# Patient Record
Sex: MALE | Race: OTHER | NOT HISPANIC OR LATINO | URBAN - METROPOLITAN AREA
[De-identification: names, ages, dates, MRNs, and addresses within clinical notes are randomized per-mention and may not be internally consistent; named-entity substitution may affect disease eponyms.]

---

## 2020-09-11 ENCOUNTER — EMERGENCY (EMERGENCY)
Facility: HOSPITAL | Age: 45
LOS: 1 days | Discharge: HOME | End: 2020-09-11
Attending: EMERGENCY MEDICINE
Payer: MEDICAID

## 2020-09-11 VITALS
DIASTOLIC BLOOD PRESSURE: 91 MMHG | RESPIRATION RATE: 18 BRPM | HEART RATE: 82 BPM | TEMPERATURE: 99 F | SYSTOLIC BLOOD PRESSURE: 153 MMHG | OXYGEN SATURATION: 96 %

## 2020-09-11 VITALS
SYSTOLIC BLOOD PRESSURE: 149 MMHG | RESPIRATION RATE: 18 BRPM | OXYGEN SATURATION: 97 % | HEART RATE: 90 BPM | DIASTOLIC BLOOD PRESSURE: 114 MMHG

## 2020-09-11 LAB
ALBUMIN SERPL ELPH-MCNC: 4.8 G/DL — SIGNIFICANT CHANGE UP (ref 3.3–5)
ALP SERPL-CCNC: 103 U/L — SIGNIFICANT CHANGE UP (ref 40–120)
ALT FLD-CCNC: 41 U/L — SIGNIFICANT CHANGE UP (ref 10–45)
ANION GAP SERPL CALC-SCNC: 14 MMOL/L — SIGNIFICANT CHANGE UP (ref 5–17)
AST SERPL-CCNC: 31 U/L — SIGNIFICANT CHANGE UP (ref 10–40)
BASOPHILS # BLD AUTO: 0.02 K/UL — SIGNIFICANT CHANGE UP (ref 0–0.2)
BASOPHILS NFR BLD AUTO: 0.2 % — SIGNIFICANT CHANGE UP (ref 0–2)
BILIRUB SERPL-MCNC: 0.3 MG/DL — SIGNIFICANT CHANGE UP (ref 0.2–1.2)
BUN SERPL-MCNC: 13 MG/DL — SIGNIFICANT CHANGE UP (ref 7–23)
CALCIUM SERPL-MCNC: 10.2 MG/DL — SIGNIFICANT CHANGE UP (ref 8.4–10.5)
CHLORIDE SERPL-SCNC: 100 MMOL/L — SIGNIFICANT CHANGE UP (ref 96–108)
CO2 SERPL-SCNC: 23 MMOL/L — SIGNIFICANT CHANGE UP (ref 22–31)
CREAT SERPL-MCNC: 1 MG/DL — SIGNIFICANT CHANGE UP (ref 0.5–1.3)
EOSINOPHIL # BLD AUTO: 0.05 K/UL — SIGNIFICANT CHANGE UP (ref 0–0.5)
EOSINOPHIL NFR BLD AUTO: 0.6 % — SIGNIFICANT CHANGE UP (ref 0–6)
GLUCOSE SERPL-MCNC: 157 MG/DL — HIGH (ref 70–99)
HCT VFR BLD CALC: 43.6 % — SIGNIFICANT CHANGE UP (ref 39–50)
HGB BLD-MCNC: 14.9 G/DL — SIGNIFICANT CHANGE UP (ref 13–17)
IMM GRANULOCYTES NFR BLD AUTO: 0.4 % — SIGNIFICANT CHANGE UP (ref 0–1.5)
LYMPHOCYTES # BLD AUTO: 2.55 K/UL — SIGNIFICANT CHANGE UP (ref 1–3.3)
LYMPHOCYTES # BLD AUTO: 29.8 % — SIGNIFICANT CHANGE UP (ref 13–44)
MCHC RBC-ENTMCNC: 32 PG — SIGNIFICANT CHANGE UP (ref 27–34)
MCHC RBC-ENTMCNC: 34.2 GM/DL — SIGNIFICANT CHANGE UP (ref 32–36)
MCV RBC AUTO: 93.8 FL — SIGNIFICANT CHANGE UP (ref 80–100)
MONOCYTES # BLD AUTO: 0.52 K/UL — SIGNIFICANT CHANGE UP (ref 0–0.9)
MONOCYTES NFR BLD AUTO: 6.1 % — SIGNIFICANT CHANGE UP (ref 2–14)
NEUTROPHILS # BLD AUTO: 5.38 K/UL — SIGNIFICANT CHANGE UP (ref 1.8–7.4)
NEUTROPHILS NFR BLD AUTO: 62.9 % — SIGNIFICANT CHANGE UP (ref 43–77)
NRBC # BLD: 0 /100 WBCS — SIGNIFICANT CHANGE UP (ref 0–0)
PLATELET # BLD AUTO: 215 K/UL — SIGNIFICANT CHANGE UP (ref 150–400)
POTASSIUM SERPL-MCNC: 4 MMOL/L — SIGNIFICANT CHANGE UP (ref 3.5–5.3)
POTASSIUM SERPL-SCNC: 4 MMOL/L — SIGNIFICANT CHANGE UP (ref 3.5–5.3)
PROT SERPL-MCNC: 7.4 G/DL — SIGNIFICANT CHANGE UP (ref 6–8.3)
RBC # BLD: 4.65 M/UL — SIGNIFICANT CHANGE UP (ref 4.2–5.8)
RBC # FLD: 12.8 % — SIGNIFICANT CHANGE UP (ref 10.3–14.5)
SODIUM SERPL-SCNC: 137 MMOL/L — SIGNIFICANT CHANGE UP (ref 135–145)
TROPONIN T, HIGH SENSITIVITY RESULT: <6 NG/L — SIGNIFICANT CHANGE UP (ref 0–51)
WBC # BLD: 8.55 K/UL — SIGNIFICANT CHANGE UP (ref 3.8–10.5)
WBC # FLD AUTO: 8.55 K/UL — SIGNIFICANT CHANGE UP (ref 3.8–10.5)

## 2020-09-11 PROCEDURE — 99284 EMERGENCY DEPT VISIT MOD MDM: CPT

## 2020-09-11 PROCEDURE — 85025 COMPLETE CBC W/AUTO DIFF WBC: CPT

## 2020-09-11 PROCEDURE — 71260 CT THORAX DX C+: CPT

## 2020-09-11 PROCEDURE — 74177 CT ABD & PELVIS W/CONTRAST: CPT

## 2020-09-11 PROCEDURE — 74177 CT ABD & PELVIS W/CONTRAST: CPT | Mod: 26

## 2020-09-11 PROCEDURE — 70450 CT HEAD/BRAIN W/O DYE: CPT | Mod: 26

## 2020-09-11 PROCEDURE — 84484 ASSAY OF TROPONIN QUANT: CPT

## 2020-09-11 PROCEDURE — 99285 EMERGENCY DEPT VISIT HI MDM: CPT

## 2020-09-11 PROCEDURE — 71260 CT THORAX DX C+: CPT | Mod: 26

## 2020-09-11 PROCEDURE — 70450 CT HEAD/BRAIN W/O DYE: CPT

## 2020-09-11 PROCEDURE — 72125 CT NECK SPINE W/O DYE: CPT | Mod: 26

## 2020-09-11 PROCEDURE — 72125 CT NECK SPINE W/O DYE: CPT

## 2020-09-11 PROCEDURE — 80053 COMPREHEN METABOLIC PANEL: CPT

## 2020-09-11 RX ORDER — ACETAMINOPHEN 500 MG
650 TABLET ORAL ONCE
Refills: 0 | Status: DISCONTINUED | OUTPATIENT
Start: 2020-09-11 | End: 2020-09-15

## 2020-09-11 NOTE — ED PROVIDER NOTE - NEUROLOGICAL, MLM
Alert and oriented, no focal deficits, no motor or sensory deficits. **ATTENDING ADDENDUM (Dr. Darius Epps): [place] coma score = 15 (eyes =4, verbal =5, motor =6). NO posturing. NO focal motor weakness. NO sensory changes. Awake, alert, coherent, interactive, and oriented to person, place, and time.

## 2020-09-11 NOTE — ED PROVIDER NOTE - NEURO NEGATIVE STATEMENT, MLM
no loss of consciousness, no gait abnormality, no headache, no sensory deficits, and no weakness. **ATTENDING ADDENDUM (Dr. Darius Epps): NO numbness, tingling, weakness, or paresthesias.

## 2020-09-11 NOTE — ED ADULT NURSE NOTE - OBJECTIVE STATEMENT
Patient is a 45 year old male complaining of chest, back, neck pain s/p trauma. Patient denies past medical history. Patient is A&O x 4. Pt reports working on a car and the car falling on his chest. pt denies LOC, denies numbness/ tingling. pt states he was ambulating after accident. pt able to move all extremities. pt placed in cervical collar. pt Denies complaints of sob, , n/v/d, headache. Abd is soft, non tender, non distended. Skin is warm and dry. Color is consistent with ethnicity. Safety and comfort maintained.  Will continue to monitor.

## 2020-09-11 NOTE — ED PROVIDER NOTE - CARE PLAN
Goal:	**ATTENDING ADDENDUM (Dr. Darius Epsp): Goals of care include resolution of emergent/urgent symptoms and concerns, and restoration to baseline level of homeostasis.   Principal Discharge DX:	Chest wall pain  Goal:	**ATTENDING ADDENDUM (Dr. Darius Epps): Goals of care include resolution of emergent/urgent symptoms and concerns, and restoration to baseline level of homeostasis.  Secondary Diagnosis:	Blunt chest trauma, initial encounter  Secondary Diagnosis:	Neck sprain, initial encounter  Secondary Diagnosis:	Shoulder strain, left, initial encounter

## 2020-09-11 NOTE — ED PROVIDER NOTE - OTHER FINDINGS
axis 25 NO ZAMZAM **ATTENDING ADDENDUM (Dr. Darius Epps): This ECG was viewed and interpreted contemporaneous with the time of actual patient care on 09/11/2020. This retrospective entry was made to the EMR on 09/13/2020 at 12:25.

## 2020-09-11 NOTE — ED PROVIDER NOTE - ENMT, MLM
Airway patent, Nasal mucosa clear. Mouth with normal mucosa. Throat has no vesicles, no oropharyngeal exudates and uvula is midline. **ATTENDING ADDENDUM (Dr. Darius Epps): AIRWAY CLEAR. NO pooling of secretions, POSITIVE gag reflex, NO debris, ABLE TO SELF-PROTECT AIRWAY. POSITIVE full range of motion of the mandible. NO temporomandibular joint tenderness with range of motion or palpation. NO dental trauma. NO malocclusion. NO facial or nasal deformity or bony tenderness. NO epistaxis or septal hematoma noted. NO jugular venous distension. NO plethora noted in the face/neck.

## 2020-09-11 NOTE — ED PROVIDER NOTE - AGGRAVATING FACTORS
**ATTENDING ADDENDUM (Dr. Darius Epps): shoulder and neck pain  worse with palpation and movement. Chest wall pain worse with movement, palpation, and breathing motion.

## 2020-09-11 NOTE — ED PROVIDER NOTE - UPPER EXTREMITY EXAM, LEFT
TENDERNESS/**ATTENDING ADDENDUM (Dr. Darius Epps): NO deformity. NO soft-tissue swelling. NO distal discoloration.

## 2020-09-11 NOTE — ED PROVIDER NOTE - PATIENT PORTAL LINK FT
You can access the FollowMyHealth Patient Portal offered by [facility] by registering at the following website: [email or web address]. By joining [employer]’s FollowMyHealth portal, you will also be able to view your health information using other applications (apps) compatible with our system.

## 2020-09-11 NOTE — ED PROVIDER NOTE - RESPIRATORY, MLM
Breath sounds clear and equal bilaterally. **ATTENDING ADDENDUM (Dr. Darius Epps): BREATHING CLEAR. POSITIVE BILATERAL BREATH SOUNDS auscultated. NO stridor, drooling, tripoding, or wheezing. POSITIVE bilateral chest wall expansion WITHOUT crepitus, tenderness, or deformity. POSITIVE midline trachea.
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
## 2020-09-11 NOTE — ED PROVIDER NOTE - SIGNIFICANT NEGATIVE FINDINGS
**ATTENDING ADDENDUM (Dr. Darius Epps): NO loss of consciousness. NO near-syncope. NO palpitations, abdominal pain, or severe back pain. NO focal or generalized weakness. NO numbness, tingling, weakness, or paresthesias.

## 2020-09-11 NOTE — ED PROVIDER NOTE - EYES, MLM
Clear bilaterally, pupils equal, round and reactive to light. **ATTENDING ADDENDUM (Dr. Darius Epps): Extraocular muscle movements intact. Clear corneas bilaterally, pupils equal and round. NO subconjunctival hemorrhage.
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
## 2020-09-11 NOTE — ED PROVIDER NOTE - EMS NOTE SUMMARY FREE TEXT FOR MDM OBTAINED AND REVIEWED OLD RECORDS QUESTION
**ATTENDING ADDENDUM (Dr. Darius Epps): EMS(s) present during patient's initial ED presentation; corroborated CC, HPI and review of systems as provided by patient.

## 2020-09-11 NOTE — ED PROVIDER NOTE - MUSCULOSKELETAL NECK EXAM
trachea midline/supple/**ATTENDING ADDENDUM (Dr. Darius Epps): POSITIVE left-sided paraspinal tenderness. NO cervical-thoracic-lumbar-sacral midline bony stepoff noted. POSITIVE mild midline cervical tenderness to palpation.

## 2020-09-11 NOTE — ED PROVIDER NOTE - SKIN, MLM
Skin normal color for race, warm, dry and intact. No evidence of rash. **ATTENDING ADDENDUM (Dr. Darius Epps): NO rashes, lesions, lacerations, abrasions, ulcers, vesicles, erythema, streaking, lymphangitic spread, crepitus, cellulitis, petechiae, purpurae, track marks or ecchymoses.

## 2020-09-11 NOTE — ED PROVIDER NOTE - PHYSICAL EXAMINATION
GENERAL: non-toxic appearing, in NAD  HEAD: atraumatic, normocephalic  EYES: vision grossly intact, no conjunctivitis or discharge  EARS: hearing grossly intact  NOSE: no nasal discharge, epistaxis   CARDIAC: ttp over anterior chest wall, RRR, normal S1S2,  no appreciable murmurs, no cyanosis, cap refill < 2 seconds  PULM: no respiratory distress, oxygen saturation on RA wnl, CTAB, no crackles, rales, rhonchi, or wheezing  GI: abdomen nondistended, soft, nontender, no guarding or rebound tenderness, no palpable masses  NEURO: awake and alert, follows commands, normal speech, PERRLA, EOMI, no focal motor or sensory deficits, normal gait  MSK: spine appears normal, no joint swelling or erythema, no gross deformities of extremities  EXT: no peripheral edema, calf tenderness, redness or swelling  SKIN: warm, dry, and intact, no rashes  PSYCH: appropriate mood and affect GENERAL: non-toxic appearing, in NAD  HEAD: atraumatic, normocephalic  EYES: vision grossly intact, no conjunctivitis or discharge  EARS: hearing grossly intact  NOSE: no nasal discharge, epistaxis   CARDIAC: ttp over anterior chest wall, RRR, normal S1S2,  no appreciable murmurs, no cyanosis, cap refill < 2 seconds  PULM: no respiratory distress, oxygen saturation on RA wnl, CTAB, no crackles, rales, rhonchi, or wheezing  GI: abdomen nondistended, soft, nontender, no guarding or rebound tenderness, no palpable masses  NEURO: awake and alert, follows commands, normal speech, PERRLA, EOMI, no focal motor or sensory deficits, normal gait  MSK: spine appears normal, no joint swelling or erythema, no gross deformities of extremities  EXT: no peripheral edema, calf tenderness, redness or swelling  SKIN: warm, dry, and intact, no rashes  PSYCH: appropriate mood and affect  **ATTENDING ADDENDUM (Dr. Darius Epps): I have reviewed and substantially contributed to the elements of the PE as documented above. I have directly performed an examination of this patient in conjunction with the other members (EM resident/PA/NP) of the patient care team. I have personally reviewed the patient's vital signs at the time of the patient's initial presentation to the ED and repeatedly throughout the ED course. GENERAL: non-toxic appearing, in NAD  HEAD: atraumatic, normocephalic  EYES: vision grossly intact, no conjunctivitis or discharge  EARS: hearing grossly intact  NOSE: no nasal discharge, epistaxis   CARDIAC: ttp over anterior chest wall, RRR, normal S1S2,  no appreciable murmurs, no cyanosis, cap refill < 2 seconds  PULM: no respiratory distress, oxygen saturation on RA wnl, CTAB, no crackles, rales, rhonchi, or wheezing  GI: abdomen nondistended, soft, nontender, no guarding or rebound tenderness, no palpable masses  NEURO: awake and alert, follows commands, normal speech, PERRLA, EOMI, no focal motor or sensory deficits, normal gait  MSK: spine appears normal, no joint swelling or erythema, no gross deformities of extremities  EXT: no peripheral edema, calf tenderness, redness or swelling  SKIN: warm, dry, and intact, no rashes  PSYCH: appropriate mood and affect  **ATTENDING ADDENDUM (Dr. Darius Epps): I have reviewed and substantially contributed to the elements of the PE as documented above. I have directly performed an examination of this patient in conjunction with the other members (EM resident/PA/NP) of the patient care team. I have personally reviewed the patient's vital signs at the time of the patient's initial presentation to the ED and repeatedly throughout the ED course. These physical examination findings were obtained contemporaneous with the time of actual patient care on 09/11/2020. This retrospective entry was made to the EMR on 09/13/2020 at 12:17.

## 2020-09-11 NOTE — ED PROVIDER NOTE - CLINICAL SUMMARY MEDICAL DECISION MAKING FREE TEXT BOX
will trauma scan, disposition pending work-up and response to treatment. will trauma scan, disposition pending work-up and response to treatment.  **ATTENDING MEDICAL DECISION MAKING/SYNTHESIS (Dr. Darius Epps): I have reviewed the Chief Concern(s), the HPI, the ROS, and have directly performed and confirmed the findings on the Physical Examination. I have reviewed the medical decision making with all providers, as applicable. The PROBLEM REPRESENTATION at this time is: XX.   The MOST LIKELY DIAGNOSIS, and the LIST OF DIFFERENTIAL DIAGNOSES, includes (but is not limited to) the following: cord/spine injury (considered but unlikely given presentation and clinical findings), intracerebral hemorrhage (NO evidence), intra-thoracic hemorrhage (hemothorax), rib fracture(s) or flail chest, intra-abdominal hemorrhage (hemoperitoneum), pelvis or other extremity injury, pneumothorax, hemoperitoneum (NO evidence of any of the latter conditions), concussion (possible), contusions (likely, mild), sprain/strain (possible). The likelihood of each of these diagnoses has been appropriately considered in the context of this patient's presentation and my evaluation. PLAN: as described in EMR, including diagnostics, therapeutics and consultation as clinically warranted. I will continue to reevaluate the patient, including the results of all testing, and monitor response to therapy throughout the patient's course in the ED. The care of this patient was in support of the [place] countermeasures to COVID-19. will trauma scan, disposition pending work-up and response to treatment.  **ATTENDING MEDICAL DECISION MAKING/SYNTHESIS (Dr. Darius Epps): I have reviewed the Chief Concern(s), the HPI, the ROS, and have directly performed and confirmed the findings on the Physical Examination. I have reviewed the medical decision making with all providers, as applicable. The PROBLEM REPRESENTATION at this time is: 45-year-old man presenting by EMS following blunt trauma while working on automobile. Car fell off [employer] stands, landing on patient's left and central anterior chest, left shoulder, and left neck. With associated pain in these areas. The MOST LIKELY DIAGNOSIS, and the LIST OF DIFFERENTIAL DIAGNOSES, includes (but is not limited to) the following: cord/spine injury (considered but unlikely given presentation and clinical findings), intracerebral hemorrhage (NO evidence; NO head trauma or loss of consciousness/near-syncope reported), intra-thoracic hemorrhage (hemothorax) and/or hemorrhagic shock (NO evidence), rib fracture(s) or flail chest, pneumothorax, obstructive shock (NO evidence), intra-abdominal hemorrhage (hemoperitoneum), pelvis or other extremity injury, hemoperitoneum (NO evidence of any of the latter conditions), concussion (possible), contusions (likely, mild), sprain/strain (possible), compartment syndrome (NO evidence), neurovascular injury (NO evidence). The likelihood of each of these diagnoses has been appropriately considered in the context of this patient's presentation and my evaluation. PLAN: as described in EMR, including diagnostics, therapeutics and consultation as clinically warranted. I will continue to reevaluate the patient, including the results of all testing, and monitor response to therapy throughout the patient's course in the ED. The care of this patient was in support of the [place] countermeasures to COVID-19.
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
## 2020-09-11 NOTE — ED ADULT NURSE NOTE - NSIMPLEMENTINTERV_GEN_ALL_ED
Implemented All Universal Safety Interventions:  [place] to call system. Call bell, personal items and telephone within reach. Instruct patient to call for assistance. Room bathroom lighting operational. Non-slip footwear when patient is off stretcher. Physically safe environment: no spills, clutter or unnecessary equipment. Stretcher in lowest position, wheels locked, appropriate side rails in place.

## 2020-09-11 NOTE — ED PROVIDER NOTE - ATTENDING CONTRIBUTION TO CARE
**ATTENDING ADDENDUM (Dr. Darius Epps): I attest that I have directly examined this patient and reviewed and formulated the diagnostic and therapeutic management plan in collaboration with the EM resident. Please see MDM note and remainder of EMR for findings from CC, HPI, ROS, and PE. (Tong)

## 2020-09-11 NOTE — ED PROVIDER NOTE - GASTROINTESTINAL, MLM
Abdomen soft, non-tender **ATTENDING ADDENDUM (Dr. Darius Epps): NO guarding, rebound, or rigidity. NO pulsatile or non-pulsatile masses. NO hernias. NO obvious hepatosplenomegaly.

## 2020-09-11 NOTE — ED PROVIDER NOTE - CHIEF COMPLAINT
The patient is a 45y Male complaining of trauma. The patient is a 45y Male complaining of blunt trauma to the left thorax and left shoulder/arm from car which accidentally fell from lifts onto patient during automotive repairs.

## 2020-09-11 NOTE — ED PROVIDER NOTE - LOCATION
**ATTENDING ADDENDUM (Dr. Darius Epps): anterior and left chest wall, left shoulder, midline and left paraspinal neck shoulder
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
## 2020-09-11 NOTE — ED PROVIDER NOTE - NS ED ROS FT
GENERAL: no fever, chills, fatigue, weight loss, night sweats  HEENT: no eye pain, discharge, conjunctivitis, ear pain, hearing loss, rhinorrhea, congestion, throat pain  CARDIAC: + chest pain  PULM: no dyspnea, wheezing  GI: no abdominal pain, nausea, vomiting, diarrhea, constipation, melena, hematochezia  : no urinary dysuria, frequency, incontinence, hematuria  NEURO: + neck pain  MSK: no joint pain, joint swelling, myalgias  SKIN: no rashes  HEME: no active bleeding, excessive bruising GENERAL: no fever, chills, fatigue, weight loss, night sweats  HEENT: no eye pain, discharge, conjunctivitis, ear pain, hearing loss, rhinorrhea, congestion, throat pain  CARDIAC: + chest pain  PULM: no dyspnea, wheezing  GI: no abdominal pain, nausea, vomiting, diarrhea, constipation, melena, hematochezia  : no urinary dysuria, frequency, incontinence, hematuria  NEURO: + neck pain  MSK: no joint pain, joint swelling, myalgias  SKIN: no rashes  HEME: no active bleeding, excessive bruising  **ATTENDING ADDENDUM (Dr. Darius Epps): During my interview with the patient, I have personally obtained and/or have directly verified the elements in the past medical/surgical history and other histories as noted earlier in the EMR, in conjunction with the other members (EM resident/PA/NP) of the patient care team. I have also personally obtained and/or have directly verified/reviewed the review of systems as documented below, in conjunction with the other members (EM resident/PA/NP) of the patient care team. GENERAL: no fever, chills, fatigue, weight loss, night sweats  HEENT: no eye pain, discharge, conjunctivitis, ear pain, hearing loss, rhinorrhea, congestion, throat pain  CARDIAC: + chest pain  PULM: no dyspnea, wheezing  GI: no abdominal pain, nausea, vomiting, diarrhea, constipation, melena, hematochezia  : no urinary dysuria, frequency, incontinence, hematuria  NEURO: + neck pain  MSK: no joint pain, joint swelling, myalgias  SKIN: no rashes  HEME: no active bleeding, excessive bruising  **ATTENDING ADDENDUM (Dr. Darius Epps): During my interview with the patient, I have personally obtained and/or have directly verified the elements in the past medical/surgical history and other histories as noted earlier in the EMR, in conjunction with the other members (EM resident/PA/NP) of the patient care team. I have also personally obtained and/or have directly verified/reviewed the review of systems as documented below, in conjunction with the other members (EM resident/PA/NP) of the patient care team. The review of systems was obtained contemporaneous with the time of actual patient care on 09/11/2020. This retrospective entry was made to the EMR on 09/13/2020 at 12:17.

## 2020-09-11 NOTE — ED PROVIDER NOTE - ASSOCIATED SYMPTOMS
**ATTENDING ADDENDUM (Dr. Darius Epps): POSITIVE anterior left-sided chest wall pain, anterior midline and left lateral neck soreness, left shoulder pain

## 2020-09-11 NOTE — ED PROVIDER NOTE - PLAN OF CARE
**ATTENDING ADDENDUM (Dr. Darius Epps): Goals of care include resolution of emergent/urgent symptoms and concerns, and restoration to baseline level of homeostasis.

## 2020-09-11 NOTE — ED PROVIDER NOTE - SECONDARY DIAGNOSIS.
Blunt chest trauma, initial encounter Neck sprain, initial encounter Shoulder strain, left, initial encounter

## 2020-09-11 NOTE — ED PROVIDER NOTE - OBJECTIVE STATEMENT
45M presenting with neck pain, chest pain after a car fell onto him while he was working on it with it jacked up. No syncope. Denies head trauma. No AC. Denies dyspnea, abdominal pain, n/v/d. Able to ambulate. Denies alcohol or drugs. 45M presenting with neck pain, chest pain after a car fell onto him while he was working on it with it jacked up. No syncope. Denies head trauma. No AC. Denies dyspnea, abdominal pain, n/v/d. Able to ambulate. Denies alcohol or drugs.  **ATTENDING ADDENDUM (Dr. Darius Epps): I attest that I have directly and personally interviewed and examined this patient and elicited a comparable history of present illness and review of systems as documented, along with my EM resident. I attest that I have made significant contributions to the documentation where necessary and as noted in the EMR. 45M presenting with neck pain, chest pain after a car fell onto him while he was working on it with it jacked up. No syncope. Denies head trauma. No AC. Denies dyspnea, abdominal pain, n/v/d. Able to ambulate. Denies alcohol or drugs.  **ATTENDING ADDENDUM (Dr. Darius Epps): I attest that I have directly and personally interviewed and examined this patient and elicited a comparable history of present illness and review of systems as documented, along with my EM resident. I attest that I have made significant contributions to the documentation where necessary and as noted in the EMR. Of note, and in addition to the above, the patient is wearing a cervical collar at the time of presentation to the ED. Able to speak in complete sentences. Demonstrates comfortable breathing. All elements of the PRIMARY SURVEY assessed at time of presentation; CLEAR. The CC and HPI were obtained contemporaneous with the time of actual patient care on 09/11/2020. This retrospective entry was made to the EMR on 09/13/2020 at 12:17.

## 2020-09-11 NOTE — ED PROVIDER NOTE - DIAGNOSTIC INTERPRETATION
**ATTENDING ADDENDUM (Dr. Darius Epps): formal CT readings pending at time of handoff; this retrospective entry was made to the EMR on 09/13/2020 at 12:26.
